# Patient Record
Sex: MALE | Race: WHITE
[De-identification: names, ages, dates, MRNs, and addresses within clinical notes are randomized per-mention and may not be internally consistent; named-entity substitution may affect disease eponyms.]

---

## 2018-10-24 ENCOUNTER — HOSPITAL ENCOUNTER (OUTPATIENT)
Dept: HOSPITAL 80 - FED | Age: 63
Setting detail: OBSERVATION
LOS: 1 days | Discharge: HOME | End: 2018-10-25
Attending: FAMILY MEDICINE | Admitting: INTERNAL MEDICINE
Payer: COMMERCIAL

## 2018-10-24 DIAGNOSIS — R07.89: Primary | ICD-10-CM

## 2018-10-24 DIAGNOSIS — N18.9: ICD-10-CM

## 2018-10-24 DIAGNOSIS — I12.9: ICD-10-CM

## 2018-10-24 DIAGNOSIS — Z95.5: ICD-10-CM

## 2018-10-24 DIAGNOSIS — I71.2: ICD-10-CM

## 2018-10-24 DIAGNOSIS — E86.0: ICD-10-CM

## 2018-10-24 DIAGNOSIS — Z23: ICD-10-CM

## 2018-10-24 DIAGNOSIS — E11.22: ICD-10-CM

## 2018-10-24 DIAGNOSIS — R91.1: ICD-10-CM

## 2018-10-24 DIAGNOSIS — Z79.4: ICD-10-CM

## 2018-10-24 LAB
CK SERPL-CCNC: 75 IU/L (ref 0–224)
INR PPP: 1.01 (ref 0.83–1.16)
PLATELET # BLD: 398 10^3/UL (ref 150–400)
PROTHROMBIN TIME: 13.5 SEC (ref 12–15)

## 2018-10-24 PROCEDURE — G0009 ADMIN PNEUMOCOCCAL VACCINE: HCPCS

## 2018-10-24 PROCEDURE — 96372 THER/PROPH/DIAG INJ SC/IM: CPT

## 2018-10-24 PROCEDURE — 93005 ELECTROCARDIOGRAM TRACING: CPT

## 2018-10-24 PROCEDURE — 96374 THER/PROPH/DIAG INJ IV PUSH: CPT

## 2018-10-24 PROCEDURE — 96361 HYDRATE IV INFUSION ADD-ON: CPT

## 2018-10-24 PROCEDURE — 71275 CT ANGIOGRAPHY CHEST: CPT

## 2018-10-24 PROCEDURE — G0008 ADMIN INFLUENZA VIRUS VAC: HCPCS

## 2018-10-24 PROCEDURE — G0378 HOSPITAL OBSERVATION PER HR: HCPCS

## 2018-10-24 PROCEDURE — 99291 CRITICAL CARE FIRST HOUR: CPT

## 2018-10-24 NOTE — CPEKG
Test Reason : OPEN

Blood Pressure : ***/*** mmHG

Vent. Rate : 062 BPM     Atrial Rate : 063 BPM

   P-R Int : 162 ms          QRS Dur : 110 ms

    QT Int : 426 ms       P-R-T Axes : 011 075 102 degrees

   QTc Int : 433 ms

 

Sinus rhythm

Probable left atrial enlargement

 

Confirmed by Kwaebna Bee ([id number]) on 10/24/2018 2:31:22 PM

 

Referred By:             Confirmed By:Kwabena Bee

## 2018-10-24 NOTE — EDPHY
H & P


Stated Complaint: chest pain


Time Seen by Provider: 10/24/18 14:18


HPI/ROS: 





CHIEF COMPLAINT:  Left-sided chest pain radiating to back





HISTORY OF PRESENT ILLNESS:  The patient is a 63-year-old man visiting from 

[place] who comes to the emergency department complaining of left-sided chest 

pain 2/10 that is radiating to his back near the left shoulder blade.  It is 

been present constantly for 3 days.  It is not worsened by exertion.  It is not 

worsened by movement.  No nausea vomiting.  No diaphoresis.  No 

lightheadedness.  No shortness of breath.  He denies leg pain or swelling.  He 

has a history of coronary artery disease with a single stent placed several 

years ago.  He states that he had a follow-up angiogram 1 month ago that was 

negative.  He also had a AAA repaired with the endovascular stent in 2007 and a 

carotid endarterectomy several years ago complicated by a CVA that has since 

resolved.  No recent fevers, infections or cough.


Severity:  Minimal


Modifying factors:  None





REVIEW OF SYSTEMS:


Constitutional:  denies: chills, fever, recent illness, recent injury


EENTM: denies: blurred vision, double vision, nose congestion


Respiratory: denies: cough, shortness of breath


Cardiac:  See HPI denies: irregular heart rate, lightheadedness, palpitations


Gastrointestinal/Abdominal: denies: abdominal pain, diarrhea, nausea, vomiting, 

blood streaked stools


Genitourinary: denies: dysuria, frequency, hematuria, pain


Musculoskeletal: denies: joint pain, muscle pain


Skin: denies: lesions, rash, jaundice, bruising


Neurological: denies: headache, numbness, paresthesia, tingling, dizziness, 

weakness


Hematologic/Lymphatic: denies: blood clots, easy bleeding, easy bruising


Immunologic/allergic: denies: HIV/AIDS, transplant


 10 systems reviewed and negative except as noted





EXAM:


GENERAL:  Well-appearing, obese and in no acute distress.


HEAD:  Atraumatic, normocephalic.


EYES:  Pupils equal round and reactive to light, extraocular movements intact, 

sclera anicteric, conjunctiva are normal.


ENT:  TMs normal, nares patent, oropharynx clear without exudates.  Moist 

mucous membranes.


NECK:  Normal range of motion, supple without lymphadenopathy or JVD.


LUNGS:  Breath sounds clear to auscultation bilaterally and equal.  No wheezes 

rales or rhonchi.


HEART:  Regular rate and rhythm without murmurs, rubs or gallops.


ABDOMEN:  Soft, nontender, normoactive bowel sounds.  No guarding, no rebound.  

No masses appreciated. 


BACK:  No CVA tenderness, no spinal tenderness, step-offs or deformities


EXTREMITIES:  Normal range of motion, no pitting or edema.  No clubbing or 

cyanosis.


NEUROLOGICAL:  Cranial nerves II through XII grossly intact.  Normal speech, 

normal gait.  5/5 strength, normal movement in all extremities, normal sensation

, normal reflexes


PSYCH:  Normal mood, normal affect.


SKIN:  Warm, dry, normal turgor, no visible rashes or lesions.








Source: Patient


Exam Limitations: No limitations





- Medical/Surgical History


Hx Asthma: No


Hx Chronic Respiratory Disease: No


Hx Diabetes: Yes


Hx Cardiac Disease: Yes


Hx Renal Disease: No


Hx Cirrhosis: No


Hx Alcoholism: No


Hx HIV/AIDS: No


Hx Splenectomy or Spleen Trauma: No


Other PMH: AAA with endovascular stent, coronary artery disease with a single 

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





- Social History


Smoking Status: Never smoked


Alcohol Use: Sober


Drug Use: None


Constitutional: 


 Initial Vital Signs











Temperature (C)  36.7 C   10/24/18 14:12


 


Heart Rate  73   10/24/18 14:12


 


Respiratory Rate  18   10/24/18 14:12


 


Blood Pressure  137/68 H  10/24/18 14:12


 


O2 Sat (%)  93   10/24/18 14:12








 











O2 Delivery Mode               Room Air














Allergies/Adverse Reactions: 


 





No Known Allergies Allergy (Unverified 10/24/18 14:12)


 








Home Medications: 














 Medication  Instructions  Recorded


 


Allopurinol [Allopurinol 100  mg PO DAILY 10/24/18





(*)]  


 


Atorvastatin Calcium [Lipitor 40 40 mg PO DAILY 10/24/18





mg (*)]  


 


Bisoprol/Hydrochlorothiazide 1 each PO DAILY 10/24/18





[Bisoprolol-Hctz 5-6.25 mg Tab]  


 


Clopidogrel Bisulfate [Clopidogrel] 75 mg PO DAILY 10/24/18


 


Fenofibrate,Micronized 200 mg PO DAILY 10/24/18





[Fenofibrate]  


 


Ferrous Sulfate [Ferrous Sulf 325 650 mg PO DAILY 10/24/18





MG (*)]  


 


Insulin Lispro [HumaLOG LISPRO] 15 unit SC DAILY@0800 10/24/18


 


Pantoprazole Sodium [Protonix 40mg 40 mg PO DAILY 10/24/18





(*)]  


 


Sertraline HCl [Zoloft 50mg (*)] 50 mg PO DAILY 10/24/18


 


amLODIPine BESYLATE [Norvasc 10 mg 10 mg PO DAILY 10/24/18





(*)]  














Medical Decision Making





- Diagnostics


EKG Interpretation: 





An EKG obtained and was read and documented in trace view.  Please see trace 

view for full reading and report.  Sinus rhythm, no acute ischemic changes 


Imaging Results: 


 Imaging Impressions





Chest/Thorax CTA  10/24/18 14:23


Impression: 


1. No acute pulmonary embolus.


2. Saccular aneurysm of the distal thoracic aorta measuring 2.1 cm (4.0 cm 

total for aorta + aneurysm), fed by a paravertebral vessel. Recommend 

correlation with any outside imaging to assess stability. This is less likely a 

mycotic aneurysm.


3. Mild ascending aortic aneurysm measuring 4.4 cm.


4. Bypass graft in the anterior chest is probably thrombosed.


5. A 1.0 cm left lower lobe nodule, possibly infectious or inflammatory. 

Recommend 3 month follow-up CT. 


6. Cholelithiasis.


 


Findings and recommendations discussed with BRET KAHN  at 1655 hour, 10/24

/2018.











Imaging: Discussed imaging studies w/ On call Radiologist


ED Course/Re-evaluation: 





2:50 p.m. the patient's creatinine is slightly elevated.  He denies history of 

renal disease.  It appears to be from mild dehydration.  He is receiving a L of 

fluid.  Once this is done will recheck his creatinine.  He will require CT 

scanning any ways because my concern is more for dissection which outweighs any 

damage to his kidneys which would likely be reversible.





5:40 p.m. I discussed the case with Dr. Rogelio ross from cardiothoracic 

surgery who will evaluate.  I also discussed the case with hospitalist who will 

admit.  We are working on paperwork from [place].








Differential Diagnosis: 





Partial list of the Differential diagnosis considered include but were not 

limited to;  musculoskeletal pain, dehydration, acute coronary disease, PE 

aneurysm, dissection and although unlikely based on the history and physical 

exam, I also considered pneumonia, pneumothorax, biliary disease.  I discussed 

these differential diagnoses and the plan with the patient as well as the usual 

and expected course.  The patient understands that the diagnosis is provisional 

and that in medicine we are not always correct and that further workup is often 

warranted.  Usual and customary warnings were given.  All of the patient's 

questions were answered.  The patient was instructed to return to the emergency 

department should the symptoms at all worsen or return, otherwise to followup 

with the physician as we discussed.


Critical Care Time: 





Critical care time spent by me, Dr. Kahn exclusive with this patient was 35 

minutes, exclusive of the PA time exclusive of procedures.  The organ system 

that was at risk was cardiovascular and I gave diagnostics, consultation and 

admission to prevent worsening of the patient's condition





- Data Points


Laboratory Results: 


 Laboratory Results





 10/24/18 14:25 





 10/24/18 15:30 





 











  10/24/18 10/24/18 10/24/18





  15:30 15:30 15:30


 


WBC      





    


 


RBC      





    


 


Hgb      





    


 


Hct      





    


 


MCV      





    


 


MCH      





    


 


MCHC      





    


 


RDW      





    


 


Plt Count      





    


 


MPV      





    


 


Neut % (Auto)      





    


 


Lymph % (Auto)      





    


 


Mono % (Auto)      





    


 


Eos % (Auto)      





    


 


Baso % (Auto)      





    


 


Nucleat RBC Rel Count      





    


 


Absolute Neuts (auto)      





    


 


Absolute Lymphs (auto)      





    


 


Absolute Monos (auto)      





    


 


Absolute Eos (auto)      





    


 


Absolute Basos (auto)      





    


 


Absolute Nucleated RBC      





    


 


Immature Gran %      





    


 


Immature Gran #      





    


 


PT      





    


 


INR      





    


 


APTT      





    


 


D-Dimer      





    


 


Sodium    134 mEq/L L mEq/L  





    (135-145)  


 


Potassium    5.5 mEq/L H mEq/L  





    (3.3-5.0)  


 


Chloride    102 mEq/L mEq/L  





    ()  


 


Carbon Dioxide    24 mEq/l mEq/l  





    (22-31)  


 


Anion Gap    8 mEq/L mEq/L  





    (6-14)  


 


BUN    37 mg/dL H mg/dL  





    (7-23)  


 


Creatinine    2.0 mg/dL H mg/dL  





    (0.7-1.3)  


 


Estimated GFR    34   





    


 


Glucose    292 mg/dL H mg/dL  





    ()  


 


Calcium    8.9 mg/dL mg/dL  





    (8.5-10.4)  


 


Total Bilirubin      





    


 


Conjugated Bilirubin      





    


 


Unconjugated Bilirubin      





    


 


AST      





    


 


ALT      





    


 


Alkaline Phosphatase      





    


 


Creatine Kinase      75 IU/L IU/L





     (0-224) 


 


POC Troponin I      





    


 


NT-Pro-B Natriuret Pep  Pending     





    


 


Total Protein      





    


 


Albumin      





    


 


Lipase      





    














  10/24/18 10/24/18 10/24/18





  14:25 14:25 14:25


 


WBC      12.13 10^3/uL H 10^3/uL





     (3.80-9.50) 


 


RBC      5.03 10^6/uL 10^6/uL





     (4.40-6.38) 


 


Hgb      13.8 g/dL g/dL





     (13.7-17.5) 


 


Hct      42.3 % %





     (40.0-51.0) 


 


MCV      84.1 fL fL





     (81.5-99.8) 


 


MCH      27.4 pg L pg





     (27.9-34.1) 


 


MCHC      32.6 g/dL g/dL





     (32.4-36.7) 


 


RDW      16.5 % H %





     (11.5-15.2) 


 


Plt Count      398 10^3/uL 10^3/uL





     (150-400) 


 


MPV      9.9 fL fL





     (8.7-11.7) 


 


Neut % (Auto)      72.6 % %





     (39.3-74.2) 


 


Lymph % (Auto)      18.1 % %





     (15.0-45.0) 


 


Mono % (Auto)      7.6 % %





     (4.5-13.0) 


 


Eos % (Auto)      0.9 % %





     (0.6-7.6) 


 


Baso % (Auto)      0.4 % %





     (0.3-1.7) 


 


Nucleat RBC Rel Count      0.0 % %





     (0.0-0.2) 


 


Absolute Neuts (auto)      8.81 10^3/uL H 10^3/uL





     (1.70-6.50) 


 


Absolute Lymphs (auto)      2.19 10^3/uL 10^3/uL





     (1.00-3.00) 


 


Absolute Monos (auto)      0.92 10^3/uL H 10^3/uL





     (0.30-0.80) 


 


Absolute Eos (auto)      0.11 10^3/uL 10^3/uL





     (0.03-0.40) 


 


Absolute Basos (auto)      0.05 10^3/uL 10^3/uL





     (0.02-0.10) 


 


Absolute Nucleated RBC      0.00 10^3/uL 10^3/uL





     (0-0.01) 


 


Immature Gran %      0.4 % %





     (0.0-1.1) 


 


Immature Gran #      0.05 10^3/uL 10^3/uL





     (0.00-0.10) 


 


PT    13.5 SEC SEC  





    (12.0-15.0)  


 


INR    1.01   





    (0.83-1.16)  


 


APTT    27.8 SEC SEC  





    (23.0-38.0)  


 


D-Dimer    0.48 ug/mLFEU ug/mLFEU  





    (0.00-0.50)  


 


Sodium  134 mEq/L L mEq/L    





   (135-145)   


 


Potassium  5.1 mEq/L H mEq/L    





   (3.3-5.0)   


 


Chloride  100 mEq/L mEq/L    





   ()   


 


Carbon Dioxide  22 mEq/l mEq/l    





   (22-31)   


 


Anion Gap  12 mEq/L mEq/L    





   (6-14)   


 


BUN  37 mg/dL H mg/dL    





   (7-23)   


 


Creatinine  2.0 mg/dL H mg/dL    





   (0.7-1.3)   


 


Estimated GFR  34     





    


 


Glucose  333 mg/dL H mg/dL    





   ()   


 


Calcium  9.3 mg/dL mg/dL    





   (8.5-10.4)   


 


Total Bilirubin  0.5 mg/dL mg/dL    





   (0.1-1.4)   


 


Conjugated Bilirubin  0.2 mg/dL mg/dL    





   (0.0-0.5)   


 


Unconjugated Bilirubin  0.3 mg/dL mg/dL    





   (0.0-1.1)   


 


AST  22 IU/L IU/L    





   (17-59)   


 


ALT  22 IU/L IU/L    





   (21-72)   


 


Alkaline Phosphatase  76 IU/L IU/L    





   ()   


 


Creatine Kinase      





    


 


POC Troponin I      





    


 


NT-Pro-B Natriuret Pep      





    


 


Total Protein  7.6 g/dL g/dL    





   (6.3-8.2)   


 


Albumin  3.8 g/dL g/dL    





   (3.5-5.0)   


 


Lipase  288 IU/L IU/L    





   ()   














  10/24/18





  14:21


 


WBC  





  


 


RBC  





  


 


Hgb  





  


 


Hct  





  


 


MCV  





  


 


MCH  





  


 


MCHC  





  


 


RDW  





  


 


Plt Count  





  


 


MPV  





  


 


Neut % (Auto)  





  


 


Lymph % (Auto)  





  


 


Mono % (Auto)  





  


 


Eos % (Auto)  





  


 


Baso % (Auto)  





  


 


Nucleat RBC Rel Count  





  


 


Absolute Neuts (auto)  





  


 


Absolute Lymphs (auto)  





  


 


Absolute Monos (auto)  





  


 


Absolute Eos (auto)  





  


 


Absolute Basos (auto)  





  


 


Absolute Nucleated RBC  





  


 


Immature Gran %  





  


 


Immature Gran #  





  


 


PT  





  


 


INR  





  


 


APTT  





  


 


D-Dimer  





  


 


Sodium  





  


 


Potassium  





  


 


Chloride  





  


 


Carbon Dioxide  





  


 


Anion Gap  





  


 


BUN  





  


 


Creatinine  





  


 


Estimated GFR  





  


 


Glucose  





  


 


Calcium  





  


 


Total Bilirubin  





  


 


Conjugated Bilirubin  





  


 


Unconjugated Bilirubin  





  


 


AST  





  


 


ALT  





  


 


Alkaline Phosphatase  





  


 


Creatine Kinase  





  


 


POC Troponin I  0.03 ng/mL ng/mL





   (0.00-0.08) 


 


NT-Pro-B Natriuret Pep  





  


 


Total Protein  





  


 


Albumin  





  


 


Lipase  





  











Medications Given: 


 





Sodium Chloride (Ns)  1,000 mls @ 75 mls/hr IV CONT BOZENA


   Stop: 04/22/19 18:59


   Last Admin: 10/24/18 19:45 Dose:  1,000 mls





Discontinued Medications





Sodium Chloride (Ns)  1,000 mls @ 0 mls/hr IV EDNOW ONE; Wide Open


   PRN Reason: Protocol


   Stop: 10/24/18 14:23


   Last Admin: 10/24/18 14:29 Dose:  1,000 mls


Insulin Human Regular (Humulin R)  10 unit IVP ONCE ONE


   Stop: 10/24/18 18:55


   Last Admin: 10/24/18 19:46 Dose:  10 units





Point of Care Test Results: 


 Chemistry











  10/24/18





  14:21


 


POC Troponin I  0.03 ng/mL ng/mL





   (0.00-0.08) 














Departure





- Departure


Disposition: [facility] Inpatient Acute


Clinical Impression: 


Chest pain


Qualifiers:


 Chest pain type: unspecified Qualified Code(s): R07.9 - Chest pain, unspecified





Condition: Fair

## 2018-10-24 NOTE — GHP
DATE OF ADMISSION:  10/24/2018



CHIEF COMPLAINT:  Chest pain.



HISTORY OF PRESENT ILLNESS:  This is a 63-year-old man visiting from [place] who is accompanied by hi
s sister.  All history is obtained through him as there are no other notes available.  He is not a gr
eat historian.  He has had chest pain for about 3 days described as left-sided anterior reproducible 
with palpation.  It is sharp and 2/6.  He has significant vascular disease including history of a tho
racic aortic stent, as well as a stroke.  He has been told that he has kidney problems though he is n
ot sure exactly the extent.  He was started on insulin about 2 years ago for his diabetes. 



He believes that he may be slightly dehydrated.  He has not been drinking a lot of water although he 
has been drinking milk.



PAST MEDICAL/SURGICAL HISTORY:  

1.  Thoracic aortic stent placed in 2007.  During this hospitalization, he had what sounds like an em
bolic stroke which resulted in left eye blindness, as well as left-sided weakness.  At some point, he
 had an emergent thrombectomy from his left carotid artery.

2.  Peptic ulcer disease with a GI bleed in 2017.

3.  Suspected chronic kidney disease, though his baseline is unknown.

4.  Diabetes mellitus on insulin.

5.  Hypertension.

6.  Hyperlipidemia.



MEDICATIONS:  Please see medication reconciliation.



ALLERGIES:  No known drug allergies.



SOCIAL HISTORY:  Does not drink or smoke.  He is visiting his sister in [place].  [place] is here from [place].



FAMILY HISTORY:  No significant vascular disease.  Both of his parents lived into their 90s.



REVIEW OF SYSTEMS:  A 10-point review of systems is conducted and is negative except per HPI.



PHYSICAL EXAM:  VITAL SIGNS:  Blood pressure 131/98, heart rate 72, respirations 18, saturating 96% o
n room air.  Temperature is 36.7.  GENERAL:  The patient is a pleasant man who is lying in bed in no 
acute distress.  HEENT:  Normocephalic, atraumatic.  NECK:  Shows a left-sided surgical scar over his
 left carotid.  CARDIOVASCULAR:  Shows regular rate and rhythm.  No murmurs, rubs, or gallops.  CHEST
:  Shows him to be tender to palpation over the left anterior chest.  PULMONARY:  Shows him to be in 
no respiratory distress.  His lungs are clear bilaterally.  ABDOMEN:  Soft, nontender, nondistended. 
 SKIN:  Shows no rash.  :  No Rosenbaum.  NEUROLOGIC:  Shows him to be alert and oriented x3.  He is mo
ving all extremities.  PSYCHIATRIC:  Normal mood and affect.



LABORATORIES:  Sodium 134, potassium 5.5, creatinine is 2.0.  Glucose is 292.  INR is 1.0.  White cou
nt is 12.1.



DATA:  

1.  Discussed with Dr. Bee, will admit to the PCU.

2.  Discussed with Dr. Chen.  He does not believe that his stents were the cause of his chest pain.

3.  CT angiogram of his chest shows multiple findings including a saccular aneurysm of the distal tho
racic aorta.  He has an aortic stent in place with some mild ascending aortic aneurysm.  He has a low
er lung pulmonary nodule.  He has a thrombosed proximal left subclavian artery.  

4.  EKG shows Q-waves in leads III and F.  Otherwise he has no significant ST changes.  He does have 
T-wave flattening and T-wave inversion in I and L respectively.



IMPRESSION AND PLAN:  A 63-year-old man with significant vascular disease presents with chest pain. 

1.  Chest pain:  History of vascular disease is hard to ignore, however, chest pain seems most consis
tent with musculoskeletal on my assessment.  Dr. Chen has reviewed his images and does not believe h
is aneurysm or graft to be the cause of his chest pain.  I think given his risk factors, it is reason
able to place him in observation, monitor him on telemetry, and trend his troponins.

2.  Vascular disease:  He has an aortic graft which may be thrombosed, he has a saccular aneurysm whi
ch is fed via vertebral artery, he has a thrombosed left carotid artery.  He has had a stroke in the 
setting of having this aortic graft placed in 2007.  Has med rec is still pending, he is unsure exact
ly his medications though I believe that he is on Plavix for this.  We will continue this.

3.  Kidney disease:  I do not have the baseline but I suspect that his creatinine of 2 is likely at b
aseline.  He is somewhat hyperkalemic.  Per ED report, this actually went up with IV fluids.  He does
 not have any concerning EKG findings, at this point.  Regardless of the ED report, I do not believe 
that he is volume overloaded.  I think that is reasonable to place him on IV fluids, check frequent b
asic metabolic panels, give him some Kayexalate as well as insulin, and follow his potassium closely.
 

4.  Pulmonary nodule:  This will need outpatient followup in 3 months.

5.  Diabetes mellitus:  We will check his sugars.  I have given him intravenous insulin as above.  He
 is currently on subcutaneous insulin.  This will need to be continued possibly tomorrow.  He is hype
rglycemic now.

6.  Hypertension:  Continue his antihypertensives.

7.  Code status:  He would like to be do not resuscitate.  He is clear on what this means.

8.  Venous thromboembolism risk:  He is moderate if he converts to inpatient.  Would place him on VTE
 prophylaxis.





Job #:  [id number]

## 2018-10-25 VITALS — SYSTOLIC BLOOD PRESSURE: 130 MMHG | DIASTOLIC BLOOD PRESSURE: 61 MMHG

## 2018-10-25 LAB — PLATELET # BLD: 359 10^3/UL (ref 150–400)

## 2018-10-25 RX ADMIN — ACETAMINOPHEN PRN MG: 325 TABLET ORAL at 13:27

## 2018-10-25 RX ADMIN — ACETAMINOPHEN PRN MG: 325 TABLET ORAL at 04:33

## 2018-10-25 NOTE — PDDCSUM
Discharge Summary


Discharge Summary: 





The pt is a 62 yo male from [place] who is visiting family who was admitted 

with chest pain. Please see H&P. He has a hx of thoracic aortic stent during 

which he had a stroke in 2007. He was admitted. CTA showed multiple findings 

including saccular aneurysm of the distal thoracic aorta, mild ascending aortic 

aneurysm, and thrombosed proximal left subclavian artery. He also had a 

pulmonary nodule.





Given his hx of aortic stent and the CTA findings, our hospital cardio thoracic 

surgeon consulted. He reviewed imaging from [place] as well as discussed the 

case with the pt's physician. Ultimately it was decided that the cause of his 

chest pain was not due to his aorta and that he did not need urgent 

intervention. He is to return to [place] and f/u with his team.





He was monitored overnight and his cp resolved. He has not had any chest pain 

today. His VS are reassuring and w/o need of supplemental O2. 





DDX:





#Chest pain


#Vascular disease


#chronic renal failure: his cr is likely at baseline. He does not appear 

dehydrated. He was given IVF


#pulmonary nodule: he will need f/u in 3 months


#IDDM: cont with home regimen


#HTN: cont with home regimen








Exam:


NAD


[id number]


RRR


CTA B


S/NT/ND


NO LE EDEMA





MEDS: SEE MED REC





TOTAL TIME SPENT ON D/C IS 40 MINS INCLUDING BEDSIDE ROUNDING WITH PHARMACY, 

NURSING, AND CM, AND D/W DR. MENDOZA WITH SURGERY.

## 2018-10-25 NOTE — ASMTCMCOM
CM Note

 

CM Note                       

Notes:

APt is a 64 y/o man admitted for chest pain. Pt is visiting from [place]. Dr. Chen is consulting on his 


case. Pt will most likely d/c independent when medically stable. No therapies ordered at this 

time. CM available for changes.







Plan: Independent w/ supportive wife

 

Date Signed:  10/25/2018 12:15 PM

Electronically Signed By:OVIDIO Bob